# Patient Record
Sex: MALE | Race: WHITE | HISPANIC OR LATINO
[De-identification: names, ages, dates, MRNs, and addresses within clinical notes are randomized per-mention and may not be internally consistent; named-entity substitution may affect disease eponyms.]

---
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
## 2023-07-13 NOTE — ED PROVIDER NOTE - CONDITION AT DISCHARGE:
Satisfactory Paramedian Forehead Flap Division And Inset Text: Division and inset of the paramedian forehead flap was performed to achieve optimal aesthetic result, restore normal anatomic appearance and avoid distortion of normal anatomy, expedite and facilitate wound healing, achieve optimal functional result and because linear closure either not possible or would produce suboptimal result. The patient was prepped and draped in the usual manner. The pedicle was infiltrated with local anesthesia. The pedicle was sectioned with a #15 blade. The pedicle was de-bulked and trimmed to match the shape of the defect. Hemostasis was achieved. The flap donor site and free margin of the flap were secured with deep buried sutures and the wound edges were re-approximated.

## 2024-02-27 NOTE — ED PROVIDER NOTE - NS HIV RISK FACTOR YES
Outside referral received by patient's PCP,; procedure performed on 2/21/24; results noted Severe Obstructive Sleep apnea(PAIGE) with Severe Oxygen Desaturation; results routed to PCP to address recommendations with the patient.    Declined

## 2024-07-02 ENCOUNTER — INPATIENT (INPATIENT)
Facility: HOSPITAL | Age: 19
LOS: 6 days | Discharge: HOME | DRG: 751 | End: 2024-07-09
Attending: PSYCHIATRY & NEUROLOGY | Admitting: PSYCHIATRY & NEUROLOGY
Payer: MEDICAID

## 2024-07-02 VITALS
OXYGEN SATURATION: 99 % | HEIGHT: 73 IN | SYSTOLIC BLOOD PRESSURE: 133 MMHG | HEART RATE: 76 BPM | RESPIRATION RATE: 18 BRPM | DIASTOLIC BLOOD PRESSURE: 77 MMHG

## 2024-07-02 DIAGNOSIS — F32.A DEPRESSION, UNSPECIFIED: ICD-10-CM

## 2024-07-02 LAB
ANION GAP SERPL CALC-SCNC: 19 MMOL/L — HIGH (ref 7–14)
APAP SERPL-MCNC: <5 UG/ML — LOW (ref 10–30)
BASOPHILS # BLD AUTO: 0.04 K/UL — SIGNIFICANT CHANGE UP (ref 0–0.2)
BASOPHILS NFR BLD AUTO: 0.4 % — SIGNIFICANT CHANGE UP (ref 0–1)
BUN SERPL-MCNC: 10 MG/DL — SIGNIFICANT CHANGE UP (ref 10–20)
CALCIUM SERPL-MCNC: 9.7 MG/DL — SIGNIFICANT CHANGE UP (ref 8.4–10.5)
CHLORIDE SERPL-SCNC: 103 MMOL/L — SIGNIFICANT CHANGE UP (ref 98–110)
CO2 SERPL-SCNC: 17 MMOL/L — SIGNIFICANT CHANGE UP (ref 17–32)
CREAT SERPL-MCNC: 0.9 MG/DL — SIGNIFICANT CHANGE UP (ref 0.7–1.5)
EGFR: 125 ML/MIN/1.73M2 — SIGNIFICANT CHANGE UP
EOSINOPHIL # BLD AUTO: 0.02 K/UL — SIGNIFICANT CHANGE UP (ref 0–0.7)
EOSINOPHIL NFR BLD AUTO: 0.2 % — SIGNIFICANT CHANGE UP (ref 0–8)
ETHANOL SERPL-MCNC: <10 MG/DL — SIGNIFICANT CHANGE UP
GLUCOSE SERPL-MCNC: 100 MG/DL — HIGH (ref 70–99)
HCT VFR BLD CALC: 47.9 % — SIGNIFICANT CHANGE UP (ref 42–52)
HGB BLD-MCNC: 15.8 G/DL — SIGNIFICANT CHANGE UP (ref 14–18)
IMM GRANULOCYTES NFR BLD AUTO: 0.4 % — HIGH (ref 0.1–0.3)
LYMPHOCYTES # BLD AUTO: 1.84 K/UL — SIGNIFICANT CHANGE UP (ref 1.2–3.4)
LYMPHOCYTES # BLD AUTO: 16.2 % — LOW (ref 20.5–51.1)
MCHC RBC-ENTMCNC: 27.5 PG — SIGNIFICANT CHANGE UP (ref 27–31)
MCHC RBC-ENTMCNC: 33 G/DL — SIGNIFICANT CHANGE UP (ref 32–37)
MCV RBC AUTO: 83.3 FL — SIGNIFICANT CHANGE UP (ref 80–94)
MONOCYTES # BLD AUTO: 0.47 K/UL — SIGNIFICANT CHANGE UP (ref 0.1–0.6)
MONOCYTES NFR BLD AUTO: 4.1 % — SIGNIFICANT CHANGE UP (ref 1.7–9.3)
NEUTROPHILS # BLD AUTO: 8.97 K/UL — HIGH (ref 1.4–6.5)
NEUTROPHILS NFR BLD AUTO: 78.7 % — HIGH (ref 42.2–75.2)
NRBC # BLD: 0 /100 WBCS — SIGNIFICANT CHANGE UP (ref 0–0)
PLATELET # BLD AUTO: 214 K/UL — SIGNIFICANT CHANGE UP (ref 130–400)
PMV BLD: 9.4 FL — SIGNIFICANT CHANGE UP (ref 7.4–10.4)
POTASSIUM SERPL-MCNC: 4.6 MMOL/L — SIGNIFICANT CHANGE UP (ref 3.5–5)
POTASSIUM SERPL-SCNC: 4.6 MMOL/L — SIGNIFICANT CHANGE UP (ref 3.5–5)
RBC # BLD: 5.75 M/UL — SIGNIFICANT CHANGE UP (ref 4.7–6.1)
RBC # FLD: 13.7 % — SIGNIFICANT CHANGE UP (ref 11.5–14.5)
SALICYLATES SERPL-MCNC: <0.3 MG/DL — LOW (ref 4–30)
SODIUM SERPL-SCNC: 139 MMOL/L — SIGNIFICANT CHANGE UP (ref 135–146)
WBC # BLD: 11.38 K/UL — HIGH (ref 4.8–10.8)
WBC # FLD AUTO: 11.38 K/UL — HIGH (ref 4.8–10.8)

## 2024-07-02 PROCEDURE — 90792 PSYCH DIAG EVAL W/MED SRVCS: CPT | Mod: 95

## 2024-07-02 PROCEDURE — 99285 EMERGENCY DEPT VISIT HI MDM: CPT

## 2024-07-02 PROCEDURE — 93010 ELECTROCARDIOGRAM REPORT: CPT

## 2024-07-02 RX ORDER — CLONAZEPAM 2 MG/1
1 TABLET ORAL ONCE
Refills: 0 | Status: DISCONTINUED | OUTPATIENT
Start: 2024-07-02 | End: 2024-07-02

## 2024-07-02 RX ORDER — HYDROXYZINE PAMOATE 50 MG/1
50 CAPSULE ORAL ONCE
Refills: 0 | Status: COMPLETED | OUTPATIENT
Start: 2024-07-02 | End: 2024-07-02

## 2024-07-02 RX ADMIN — CLONAZEPAM 1 MILLIGRAM(S): 2 TABLET ORAL at 22:30

## 2024-07-02 NOTE — ED BEHAVIORAL HEALTH ASSESSMENT NOTE - MARITAL STATUS
Outpatient Medications Marked as Taking for the 11/10/20 encounter (Refill) with Blane Mcdonald MD   Medication Sig Dispense Refill   • metformin (GLUCOPHAGE) 1000 MG tablet Take 1,000 mg by mouth 2 times daily.        Med by hx only  Last Visit: 10/13/20  Next Visit: Visit date not found    Labs:     Hemoglobin A1C (%)   Date Value   10/01/2020 11.1 (H)       Refilled per [facility].   Single

## 2024-07-02 NOTE — ED BEHAVIORAL HEALTH ASSESSMENT NOTE - NSCURPASTPSYDX_PSY_ALL_CORE
Mood disorder/ADHD/Alcohol/Substance Use disorders/Cluster B Personality disorder/traits/Conduct problems

## 2024-07-02 NOTE — ED PROVIDER NOTE - EKG/XRAY ADDITIONAL INFORMATION
EKG normal sinus rhythm, right axis deviation, normal intervals, incomplete right bundle branch block, no ST depression or elevation

## 2024-07-02 NOTE — ED PROVIDER NOTE - OBJECTIVE STATEMENT
19-year-old male past medical history of PTSD, OCD, bipolar disorder presents to the ED for evaluation.  Patient with acute onset of suicidal ideations after finding out that his girlfriend has been cheating on him.  Reports that he would kill himself by jumping off a bridge.  Admits to prior suicide attempt by swallowing screws, cutting his wrists try to jump in front of moving vehicles.  Admits to intermittent auditory and visual hallucinations.  Reports that he has not been taking any of his psychiatric meds as he is homeless and does not have insurance and has not seen a psychiatrist.  No chest pain, shortness of abdominal pain, nausea, vomiting, dark,

## 2024-07-02 NOTE — ED PROVIDER NOTE - PHYSICAL EXAMINATION
CONST: Well appearing in NAD  EYES: PERRL, EOMI, Sclera and conjunctiva clear.   ENT: Oropharynx normal appearing, no erythema or exudates. Uvula midline.  CARD: Normal S1 S2; Normal rate and rhythm  RESP: Equal BS B/L, No wheezes, rhonchi or rales. No distress  GI: Soft, non-tender, non-distended.  MS: Normal ROM in all extremities. No midline spinal tenderness.  SKIN: Abrasions noted to L lateral neck.   NEURO: A&Ox3, No focal deficits. Strength 5/5 with no sensory deficits. Steady gait
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
## 2024-07-02 NOTE — ED PROVIDER NOTE - CLINICAL SUMMARY MEDICAL DECISION MAKING FREE TEXT BOX
19-year-old male with past medical history of depression, bipolar disorder, asthma, presents with suicidal ideation after finding out that his girlfriend was pregnant with another man child.  Also has recent homelessness.  Continues to report suicidal ideation.  No other physical complaints.  On exam nontoxic, vital signs noted, exam as above.  Labs no acute abnormalities.  Will admit to inpatient psychiatry

## 2024-07-02 NOTE — ED BEHAVIORAL HEALTH ASSESSMENT NOTE - VIOLENCE RISK FACTORS:
Antisocial behavior/cognition (past or present)/Substance abuse/Affective dysregulation/Irritability

## 2024-07-03 DIAGNOSIS — F32.A DEPRESSION, UNSPECIFIED: ICD-10-CM

## 2024-07-03 LAB
ALBUMIN SERPL ELPH-MCNC: 4.3 G/DL — SIGNIFICANT CHANGE UP (ref 3.5–5.2)
ALP SERPL-CCNC: 93 U/L — SIGNIFICANT CHANGE UP (ref 30–115)
ALT FLD-CCNC: 12 U/L — SIGNIFICANT CHANGE UP (ref 0–41)
ANION GAP SERPL CALC-SCNC: 6 MMOL/L — LOW (ref 7–14)
AST SERPL-CCNC: 14 U/L — SIGNIFICANT CHANGE UP (ref 0–41)
BILIRUB SERPL-MCNC: 0.5 MG/DL — SIGNIFICANT CHANGE UP (ref 0.2–1.2)
BUN SERPL-MCNC: 14 MG/DL — SIGNIFICANT CHANGE UP (ref 10–20)
CALCIUM SERPL-MCNC: 9.6 MG/DL — SIGNIFICANT CHANGE UP (ref 8.4–10.5)
CHLORIDE SERPL-SCNC: 103 MMOL/L — SIGNIFICANT CHANGE UP (ref 98–110)
CO2 SERPL-SCNC: 29 MMOL/L — SIGNIFICANT CHANGE UP (ref 17–32)
CREAT SERPL-MCNC: 0.9 MG/DL — SIGNIFICANT CHANGE UP (ref 0.7–1.5)
EGFR: 125 ML/MIN/1.73M2 — SIGNIFICANT CHANGE UP
FLUAV AG NPH QL: SIGNIFICANT CHANGE UP
FLUBV AG NPH QL: SIGNIFICANT CHANGE UP
GLUCOSE SERPL-MCNC: 76 MG/DL — SIGNIFICANT CHANGE UP (ref 70–99)
HCT VFR BLD CALC: 49.5 % — SIGNIFICANT CHANGE UP (ref 42–52)
HGB BLD-MCNC: 16.5 G/DL — SIGNIFICANT CHANGE UP (ref 14–18)
MCHC RBC-ENTMCNC: 27.8 PG — SIGNIFICANT CHANGE UP (ref 27–31)
MCHC RBC-ENTMCNC: 33.3 G/DL — SIGNIFICANT CHANGE UP (ref 32–37)
MCV RBC AUTO: 83.3 FL — SIGNIFICANT CHANGE UP (ref 80–94)
NRBC # BLD: 0 /100 WBCS — SIGNIFICANT CHANGE UP (ref 0–0)
PLATELET # BLD AUTO: 208 K/UL — SIGNIFICANT CHANGE UP (ref 130–400)
PMV BLD: 9.1 FL — SIGNIFICANT CHANGE UP (ref 7.4–10.4)
POTASSIUM SERPL-MCNC: 5 MMOL/L — SIGNIFICANT CHANGE UP (ref 3.5–5)
POTASSIUM SERPL-SCNC: 5 MMOL/L — SIGNIFICANT CHANGE UP (ref 3.5–5)
PROT SERPL-MCNC: 6.8 G/DL — SIGNIFICANT CHANGE UP (ref 6–8)
RBC # BLD: 5.94 M/UL — SIGNIFICANT CHANGE UP (ref 4.7–6.1)
RBC # FLD: 13.5 % — SIGNIFICANT CHANGE UP (ref 11.5–14.5)
RSV RNA NPH QL NAA+NON-PROBE: SIGNIFICANT CHANGE UP
SARS-COV-2 RNA SPEC QL NAA+PROBE: SIGNIFICANT CHANGE UP
SODIUM SERPL-SCNC: 138 MMOL/L — SIGNIFICANT CHANGE UP (ref 135–146)
WBC # BLD: 9.95 K/UL — SIGNIFICANT CHANGE UP (ref 4.8–10.8)
WBC # FLD AUTO: 9.95 K/UL — SIGNIFICANT CHANGE UP (ref 4.8–10.8)

## 2024-07-03 PROCEDURE — 83036 HEMOGLOBIN GLYCOSYLATED A1C: CPT

## 2024-07-03 PROCEDURE — 99231 SBSQ HOSP IP/OBS SF/LOW 25: CPT

## 2024-07-03 PROCEDURE — 36415 COLL VENOUS BLD VENIPUNCTURE: CPT

## 2024-07-03 PROCEDURE — 85027 COMPLETE CBC AUTOMATED: CPT

## 2024-07-03 PROCEDURE — 84443 ASSAY THYROID STIM HORMONE: CPT

## 2024-07-03 PROCEDURE — 80053 COMPREHEN METABOLIC PANEL: CPT

## 2024-07-03 PROCEDURE — 99253 IP/OBS CNSLTJ NEW/EST LOW 45: CPT

## 2024-07-03 RX ORDER — HALOPERIDOL DECANOATE 100 MG/ML
5 VIAL (ML) INTRAMUSCULAR EVERY 6 HOURS
Refills: 0 | Status: DISCONTINUED | OUTPATIENT
Start: 2024-07-03 | End: 2024-07-03

## 2024-07-03 RX ORDER — ALBUTEROL 90 MCG
2 AEROSOL REFILL (GRAM) INHALATION EVERY 6 HOURS
Refills: 0 | Status: DISCONTINUED | OUTPATIENT
Start: 2024-07-03 | End: 2024-07-09

## 2024-07-03 RX ORDER — HALOPERIDOL DECANOATE 100 MG/ML
5 VIAL (ML) INTRAMUSCULAR EVERY 6 HOURS
Refills: 0 | Status: DISCONTINUED | OUTPATIENT
Start: 2024-07-03 | End: 2024-07-09

## 2024-07-03 RX ORDER — LORAZEPAM 0.5 MG
2 TABLET ORAL EVERY 6 HOURS
Refills: 0 | Status: DISCONTINUED | OUTPATIENT
Start: 2024-07-03 | End: 2024-07-09

## 2024-07-03 RX ORDER — HALOPERIDOL DECANOATE 100 MG/ML
5 VIAL (ML) INTRAMUSCULAR
Refills: 0 | Status: DISCONTINUED | OUTPATIENT
Start: 2024-07-03 | End: 2024-07-09

## 2024-07-03 RX ADMIN — Medication 2 MILLIGRAM(S): at 13:43

## 2024-07-03 RX ADMIN — Medication 5 MILLIGRAM(S): at 13:45

## 2024-07-03 RX ADMIN — Medication 5 MILLIGRAM(S): at 20:18

## 2024-07-03 NOTE — BH PATIENT PROFILE - NSSBIRTDRGILLEGACT_GEN_A_CORE
[FreeTextEntry1] : X-ray of cervical spine reveals straightening of cervical lordosis mild level scoliosis grade 1 retrolithiasis of C4-C5.  Triglycerides 366 HDL 38, creatinine 155 BUN 28 GFR 46.  TSH 5.5 vitamin D 21.7 No

## 2024-07-03 NOTE — BH INPATIENT PSYCHIATRY ASSESSMENT NOTE - NSCOMMENTSUICRISKFACT_PSY_ALL_CORE
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
## 2024-07-03 NOTE — BH PATIENT PROFILE - HOME MEDICATIONS
Glucophage 500 mg oral tablet , 1 tab(s) orally 2 times a day  guanFACINE 4 mg oral tablet, extended release , 1 tab(s) orally once a day (in the morning)  Ritalin LA 20 mg/24 hr oral capsule, extended release , 1 cap(s) orally once a day (in the morning)  Colace 100 mg oral capsule , 1 cap(s) orally 2 times a day  MiraLax oral powder for reconstitution , 1 dose(s) orally 2 times a day, As Needed  Flonase 50 mcg/inh nasal spray , 1 spray(s) nasal 2 times a day  Senna 8.6 mg oral tablet , 2 tab(s) orally once a day (at bedtime)  Vyvanse 40 mg oral capsule , 1 cap(s) orally once a day (in the morning)  cloZAPine 200 mg oral tablet , 1 tab(s) orally once a day (at bedtime)  Multiple Vitamins with Iron oral tablet , 1 tab(s) orally once a day  Clozaril 100 mg oral tablet , 1 tab(s) orally 2 times a day  SEROquel 100 mg oral tablet , 1.5 tab(s) orally 2 times a day

## 2024-07-03 NOTE — BH INPATIENT PSYCHIATRY ASSESSMENT NOTE - ADDITIONAL DETAILS ALL
See hpi   Patient reports, "always thinking about how I could die" however  historical reports are so self-contradictory and his demeanor so superficial it is difficult to know what he is thinking and has thought in the past.

## 2024-07-03 NOTE — BH INPATIENT PSYCHIATRY ASSESSMENT NOTE - CURRENT MEDICATION
MEDICATIONS  (STANDING):    MEDICATIONS  (PRN):  albuterol    90 MICROgram(s) HFA Inhaler 2 Puff(s) Inhalation every 6 hours PRN Shortness of Breath  haloperidol     Tablet 5 milliGRAM(s) Oral every 6 hours PRN agitation  LORazepam     Tablet 2 milliGRAM(s) Oral every 6 hours PRN Anxiety   MEDICATIONS  (STANDING):  haloperidol     Tablet 5 milliGRAM(s) Oral two times a day    MEDICATIONS  (PRN):  albuterol    90 MICROgram(s) HFA Inhaler 2 Puff(s) Inhalation every 6 hours PRN Shortness of Breath  haloperidol     Tablet 5 milliGRAM(s) Oral every 6 hours PRN agitation  not responding to reassurance  LORazepam     Tablet 2 milliGRAM(s) Oral every 6 hours PRN Anxiety

## 2024-07-03 NOTE — BH INPATIENT PSYCHIATRY ASSESSMENT NOTE - RISK ASSESSMENT
See above  Patient describes terrible early life, isolation from everyone and chronic suicidal ideas.  He is clearly not accurate in much of his history.  However what information we do have about his historical behavior hospitalizations and legal involvement, coupled with his presentation and reports of self-harm are consistent with his severe mixed character disorder with narcissistic and borderline traits putting him at significant risk regardless of the veracity of any of the particular facts he has told us.

## 2024-07-03 NOTE — CONSULT NOTE ADULT - ASSESSMENT
18 yo M, single, non-caregiver, undomiciled, unemployed, PMH significant for asthma, per PSYCKES has PPHx of ADHD, various mood disorders(Bipolar Disorder, MDD, DMDD), multiple substance related disorders(MJ, opioids, stimulants, psychoactive substances), ODD, Antisocial PD, ASD, multiple psych admissions, including State, was last admitted to [facility] in 2023, reports pSAs by cutting (last did a few years ago). Patient admitted to in Psych for active SI with a plan. Medicine consulted for inpatient evaluation.    #Suicidal Ideations w/ plan   #MDD  #ASD  #Polysubstance abuse   #ADHD  #Bipolar PD        Plan:  EKG: RAD, RBBB  Labs and VS reviewed   - Psych management per primary team  - [facility] consult / CATCH team when appropriate   - consider SW consult when appropriate  - Should routinely follow-up with Primary Doctor/Cardiologist for EKG abnormalities
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
## 2024-07-03 NOTE — BH INPATIENT PSYCHIATRY ASSESSMENT NOTE - OTHER
at times is incongruent  superficial and not always congruent   As per judgment.   Thus far on the unit judgment is adequate to keep himself safe.  He reported at 1 point he was going to hurt himself right in the hallway but went to his room and excepted medications.
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
## 2024-07-03 NOTE — BH PATIENT PROFILE - NSICDXPASTMEDICALHX_GEN_ALL_CORE_FT
PAST MEDICAL HISTORY:  Acne     ADHD     Disruptive mood dysregulation disorder     Seasonal allergies
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
## 2024-07-03 NOTE — BH INPATIENT PSYCHIATRY ASSESSMENT NOTE - OTHER PAST PSYCHIATRIC HISTORY (INCLUDE DETAILS REGARDING ONSET, COURSE OF ILLNESS, INPATIENT/OUTPATIENT TREATMENT)
Patient identifies multiple past psychiatric hospitalizations however denies any and all drug  that was reported in the ED.   He reports when he was younger he used to cut himself which  resulted in the hospitalizations.

## 2024-07-03 NOTE — BH INPATIENT PSYCHIATRY ASSESSMENT NOTE - NSBHMSESPEECH_PSY_A_CORE
Discharge instructions reviewed with parents and questions answered at this time Normal volume, rate, productivity, spontaneity and articulation

## 2024-07-03 NOTE — BH INPATIENT PSYCHIATRY ASSESSMENT NOTE - PAST PSYCHOTROPIC MEDICATION
Guanfacine, Clozapine, Depakote   Patient reports "I cannot remember the names of any of the medicines I took". Guanfacine, Clozapine, Depakote   According to PSYCKES  not used for several years.    Patient reports "I cannot remember the names of any of the medicines I took".

## 2024-07-03 NOTE — BH INPATIENT PSYCHIATRY ASSESSMENT NOTE - NSCOMMENTSUICPROTRISKFACT_PSY_ALL_CORE
Patient reports "I have no one".  But as previously noted his reports seem to be markedly self-contradictory.

## 2024-07-03 NOTE — BH INPATIENT PSYCHIATRY ASSESSMENT NOTE - NSBHCHARTREVIEWVS_PSY_A_CORE FT
Vital Signs Last 24 Hrs  T(C): 36 (07-03-24 @ 15:25), Max: 36.8 (07-03-24 @ 06:58)  T(F): 96.8 (07-03-24 @ 15:25), Max: 98.2 (07-03-24 @ 06:58)  HR: 64 (07-03-24 @ 15:25) (64 - 76)  BP: 115/77 (07-03-24 @ 15:25) (114/49 - 133/77)  BP(mean): 71 (07-03-24 @ 03:55) (71 - 71)  RR: 18 (07-03-24 @ 15:25) (18 - 18)  SpO2: 99% (07-03-24 @ 03:55) (99% - 99%)     Vital Signs Last 24 Hrs  T(C): 36 (07-03-24 @ 15:25), Max: 36.8 (07-03-24 @ 06:58)  T(F): 96.8 (07-03-24 @ 15:25), Max: 98.2 (07-03-24 @ 06:58)  HR: 64 (07-03-24 @ 15:25) (64 - 70)  BP: 115/77 (07-03-24 @ 15:25) (114/49 - 116/69)  BP(mean): 71 (07-03-24 @ 03:55) (71 - 71)  RR: 18 (07-03-24 @ 15:25) (18 - 18)  SpO2: 99% (07-03-24 @ 03:55) (99% - 99%)

## 2024-07-03 NOTE — PSYCHIATRIC REHAB INITIAL EVALUATION - PRO INTERPRETER NEED 2
Patient was asked the following questions during liver intake call.     Referring Provider: Dr. Anupam Lee   Referring Diagnosis: Pancreatic Cancer    Liver masses / Fatty Liver   PCP: Dr. Marie Brar    1)Do you know why you have liver disease: Yes             If Alcoholic Cirrhosis is present when was your last drink: NA             Have you ever been through treatment for alcohol: No  2) Presence of Ascites: Yes Paracentesis: No  3) Presence of Hepatic Encephalopathy: No Medications: No  4) History of GI Bleeding: No  5) Oxygen Use: None  6) EGD: Yes Where: [facility] When: 2021  7) Colonoscopy: None  8) MELD Score: No Current Labs   9) Insurance information: [employer]       Policy rodarte: Spouse: Corbin Bobo       Subscriber/policy/ID number: [id number]      Group Number:     Referral intake process completed.  Patient is aware that after financial approval is received, medical records will be requested.   Patient confirmed for a callback from transplant coordinator on 2/4/2020.  Tentative evaluation date TBD.    Confirmed coordinator will discuss evaluation process in more detail at the time of their call.   Patient is aware of the need to arrange age appropriate cancer screening, vaccinations, and dental care.  Reminded patient to complete questionnaire, complete medical records release, and review packet prior to evaluation visit .  Assessed patient for special needs (ie--wheelchair, assistance, guardian, and ):  None    Patient instructed to call [phone] with questions.     Patient gave verbal consent during intake call to obtain medical records and documents outside of [facility]:  Yes    CHINEDU Palm, LPN   Solid Organ Transplant         English

## 2024-07-03 NOTE — BH INPATIENT PSYCHIATRY ASSESSMENT NOTE - NSBHMETABOLIC_PSY_ALL_CORE_FT
BMI: BMI (kg/m2): 30.2 (07-03-24 @ 06:58)  HbA1c:   Glucose:   BP: 115/77 (07-03-24 @ 15:25) (114/49 - 133/77)Vital Signs Last 24 Hrs  T(C): 36 (07-03-24 @ 15:25), Max: 36.8 (07-03-24 @ 06:58)  T(F): 96.8 (07-03-24 @ 15:25), Max: 98.2 (07-03-24 @ 06:58)  HR: 64 (07-03-24 @ 15:25) (64 - 76)  BP: 115/77 (07-03-24 @ 15:25) (114/49 - 133/77)  BP(mean): 71 (07-03-24 @ 03:55) (71 - 71)  RR: 18 (07-03-24 @ 15:25) (18 - 18)  SpO2: 99% (07-03-24 @ 03:55) (99% - 99%)      Lipid Panel:  BMI: BMI (kg/m2): 30.2 (07-03-24 @ 06:58)  HbA1c:   Glucose:   BP: 115/77 (07-03-24 @ 15:25) (114/49 - 133/77)Vital Signs Last 24 Hrs  T(C): 36 (07-03-24 @ 15:25), Max: 36.8 (07-03-24 @ 06:58)  T(F): 96.8 (07-03-24 @ 15:25), Max: 98.2 (07-03-24 @ 06:58)  HR: 64 (07-03-24 @ 15:25) (64 - 70)  BP: 115/77 (07-03-24 @ 15:25) (114/49 - 116/69)  BP(mean): 71 (07-03-24 @ 03:55) (71 - 71)  RR: 18 (07-03-24 @ 15:25) (18 - 18)  SpO2: 99% (07-03-24 @ 03:55) (99% - 99%)      Lipid Panel:

## 2024-07-03 NOTE — BH INPATIENT PSYCHIATRY ASSESSMENT NOTE - LEGAL HISTORY
See hpi Patient reports that he has never been incarcerated except overnight  despite what appears to be a significant legal history which she apparently admitted to in the ED or at least did not contradict.

## 2024-07-03 NOTE — BH PATIENT PROFILE - FUNCTIONAL ASSESSMENT - BASIC MOBILITY SECTION LABEL
Contacted patient for f/u. Patient has been having pain on bilateral sides of abdomen. She states she has a sharp pain on the L side and burning type pain on the R side. She has been taking tramadol as needed. She also has an umbilical hernia and is scheduled to see general surgeon on Monday for consultation. She reports a diminished appetite with nausea present. She is currently on macrobid for UTI. She has been having issues with constipation, taking miralax and colace. She denies chest pain or sob. She has slight edema to feet. She was tearful on the phone and states she is feeling very anxious regarding her medical issues. She is taking ativan which she states is helping. She denies needing assistance at home. She is taking her medications but mentioned she was having trouble swallowing carafate. Suggested she break it up and take with applesauce which will make it easier to swallow. Follow up appointments scheduled. She discussed having financial difficulties with her rent and other bills along with her car needs repairs. Will refer to NELLY DEE to discuss financial difficulties,  she was agreeable. Arranged to contact her back in a few weeks for f/u. She was agreeable.
.

## 2024-07-04 PROCEDURE — 99232 SBSQ HOSP IP/OBS MODERATE 35: CPT

## 2024-07-04 RX ORDER — NICOTINE POLACRILEX 2 MG/1
2 LOZENGE ORAL EVERY 4 HOURS
Refills: 0 | Status: DISCONTINUED | OUTPATIENT
Start: 2024-07-04 | End: 2024-07-09

## 2024-07-04 RX ORDER — FLUTICASONE PROPIONATE 50 UG/1
1 SPRAY, METERED NASAL
Refills: 0 | Status: DISCONTINUED | OUTPATIENT
Start: 2024-07-04 | End: 2024-07-09

## 2024-07-04 RX ADMIN — Medication 5 MILLIGRAM(S): at 20:27

## 2024-07-04 RX ADMIN — Medication 5 MILLIGRAM(S): at 08:01

## 2024-07-04 RX ADMIN — FLUTICASONE PROPIONATE 1 SPRAY(S): 50 SPRAY, METERED NASAL at 20:27

## 2024-07-04 RX ADMIN — Medication 2 MILLIGRAM(S): at 20:59

## 2024-07-04 NOTE — BH INPATIENT PSYCHIATRY PROGRESS NOTE - NSBHATTESTTYPEVISIT_PSY_A_CORE
On-site Attending with Resident/Fellow/Student and LÓPEZ (99XXX codes) Attending with Resident/Fellow/Student

## 2024-07-04 NOTE — BH INPATIENT PSYCHIATRY PROGRESS NOTE - OTHER
Thus far on the unit judgment is adequate to keep himself safe.  He reported at 1 point he was going to hurt himself right in the hallway but went to his room and accepted medications.   As per judgment. Not readily engaging.

## 2024-07-05 LAB
A1C WITH ESTIMATED AVERAGE GLUCOSE RESULT: 5.1 % — SIGNIFICANT CHANGE UP (ref 4–5.6)
ESTIMATED AVERAGE GLUCOSE: 100 MG/DL — SIGNIFICANT CHANGE UP (ref 68–114)
TSH SERPL-MCNC: 1.23 UIU/ML — SIGNIFICANT CHANGE UP (ref 0.27–4.2)

## 2024-07-05 RX ORDER — ACETAMINOPHEN 325 MG
650 TABLET ORAL EVERY 6 HOURS
Refills: 0 | Status: DISCONTINUED | OUTPATIENT
Start: 2024-07-05 | End: 2024-07-09

## 2024-07-05 RX ORDER — PAROXETINE HYDROCHLORIDE 37.5 MG/1
10 TABLET, FILM COATED, EXTENDED RELEASE ORAL DAILY
Refills: 0 | Status: DISCONTINUED | OUTPATIENT
Start: 2024-07-05 | End: 2024-07-09

## 2024-07-05 RX ORDER — TRAZODONE HYDROCHLORIDE 50 MG/1
50 TABLET, FILM COATED ORAL AT BEDTIME
Refills: 0 | Status: DISCONTINUED | OUTPATIENT
Start: 2024-07-05 | End: 2024-07-09

## 2024-07-05 RX ADMIN — FLUTICASONE PROPIONATE 1 SPRAY(S): 50 SPRAY, METERED NASAL at 09:23

## 2024-07-05 RX ADMIN — TRAZODONE HYDROCHLORIDE 50 MILLIGRAM(S): 50 TABLET, FILM COATED ORAL at 22:03

## 2024-07-05 RX ADMIN — FLUTICASONE PROPIONATE 1 SPRAY(S): 50 SPRAY, METERED NASAL at 20:04

## 2024-07-05 RX ADMIN — Medication 2 MILLIGRAM(S): at 09:57

## 2024-07-05 RX ADMIN — Medication 2 MILLIGRAM(S): at 20:04

## 2024-07-05 RX ADMIN — Medication 5 MILLIGRAM(S): at 20:04

## 2024-07-05 RX ADMIN — Medication 5 MILLIGRAM(S): at 08:14

## 2024-07-05 NOTE — BH DISCHARGE NOTE NURSING/SOCIAL WORK/PSYCH REHAB - NSDCPEWEB_GEN_ALL_CORE
[facility] for Tobacco Control website --- [email or web address] website --- [email or web address]

## 2024-07-05 NOTE — BH DISCHARGE NOTE NURSING/SOCIAL WORK/PSYCH REHAB - NSDCPEEMAIL_GEN_ALL_CORE
[facility] for Tobacco Control email [email or web address]

## 2024-07-05 NOTE — BH INPATIENT PSYCHIATRY PROGRESS NOTE - OTHER
Patient repeatedly blames problems on others but does not seem delusional. Patient punched a wall shortly after interview. Not readily engaging. Behavior was also labile - yelling then apologized shortly thereafter. Thus far on the unit judgment is adequate to keep himself safe.  He reported at 1 point he was going to hurt himself right in the hallway but went to his room and accepted medications. Repeated outbursts represent significant lapses in judgment since they are not getting him what he wants.   As per judgment. Today, was able to make eye contact and connect during conversation.

## 2024-07-05 NOTE — BH DISCHARGE NOTE NURSING/SOCIAL WORK/PSYCH REHAB - NSCDUDCCRISIS_PSY_A_CORE
[facility] Well  1 (339) [facility]LL ([phone])  Text "WELL" to 77886  Website: www.[employer].Medcurrent/.National Suicide Prevention Lifeline 5 (869) 546-6396/.  [employer]  1 (434) LIFENET (633-3513)/988 Suicide and Crisis Lifeline

## 2024-07-05 NOTE — BH DISCHARGE NOTE NURSING/SOCIAL WORK/PSYCH REHAB - PATIENT PORTAL LINK FT
You can access the FollowMyHealth Patient Portal offered by [facility] by registering at the following website: [email or web address]. By joining [facility]’s FollowMyHealth portal, you will also be able to view your health information using other applications (apps) compatible with our system.

## 2024-07-05 NOTE — BH DISCHARGE NOTE NURSING/SOCIAL WORK/PSYCH REHAB - NSBHDCAGENCY2FT_PSY_A_CORE
[facility]  Project [facility]   [facility] [place]

## 2024-07-05 NOTE — BH DISCHARGE NOTE NURSING/SOCIAL WORK/PSYCH REHAB - NSBHDCAGENCY1FT_PSY_A_CORE
unknown . From [facility]  [facility]   With Tamika

## 2024-07-05 NOTE — BH DISCHARGE NOTE NURSING/SOCIAL WORK/PSYCH REHAB - NSTOBACCOSMKCESSPRO_PSY_ALL_CORE
.  [facility] Smoking Cessation Support Group   [facility]  [address] [place] [address] [place] [address]  [phone] or [phone]

## 2024-07-06 PROCEDURE — 99231 SBSQ HOSP IP/OBS SF/LOW 25: CPT

## 2024-07-06 RX ADMIN — Medication 2 MILLIGRAM(S): at 22:31

## 2024-07-06 RX ADMIN — PAROXETINE HYDROCHLORIDE 10 MILLIGRAM(S): 37.5 TABLET, FILM COATED, EXTENDED RELEASE ORAL at 08:12

## 2024-07-06 RX ADMIN — FLUTICASONE PROPIONATE 1 SPRAY(S): 50 SPRAY, METERED NASAL at 20:23

## 2024-07-06 RX ADMIN — Medication 2 MILLIGRAM(S): at 15:07

## 2024-07-06 RX ADMIN — TRAZODONE HYDROCHLORIDE 50 MILLIGRAM(S): 50 TABLET, FILM COATED ORAL at 20:23

## 2024-07-06 RX ADMIN — Medication 5 MILLIGRAM(S): at 20:23

## 2024-07-06 RX ADMIN — FLUTICASONE PROPIONATE 1 SPRAY(S): 50 SPRAY, METERED NASAL at 08:12

## 2024-07-06 RX ADMIN — Medication 5 MILLIGRAM(S): at 08:13

## 2024-07-06 NOTE — BH INPATIENT PSYCHIATRY PROGRESS NOTE - OTHER
Not readily engaging. Behavior was also labile - yelling then apologized shortly thereafter.   As per judgment. Patient punched a wall shortly after interview. Patient repeatedly blames problems on others but does not seem delusional. Today, was able to make eye contact and connect during conversation. Thus far on the unit judgment is adequate to keep himself safe.  He reported at 1 point he was going to hurt himself right in the hallway but went to his room and accepted medications. Repeated outbursts represent significant lapses in judgment since they are not getting him what he wants.

## 2024-07-07 RX ADMIN — Medication 2 MILLIGRAM(S): at 19:54

## 2024-07-07 RX ADMIN — Medication 5 MILLIGRAM(S): at 08:01

## 2024-07-07 RX ADMIN — Medication 5 MILLIGRAM(S): at 19:53

## 2024-07-07 RX ADMIN — PAROXETINE HYDROCHLORIDE 10 MILLIGRAM(S): 37.5 TABLET, FILM COATED, EXTENDED RELEASE ORAL at 08:01

## 2024-07-07 RX ADMIN — FLUTICASONE PROPIONATE 1 SPRAY(S): 50 SPRAY, METERED NASAL at 19:53

## 2024-07-07 RX ADMIN — TRAZODONE HYDROCHLORIDE 50 MILLIGRAM(S): 50 TABLET, FILM COATED ORAL at 19:53

## 2024-07-07 RX ADMIN — FLUTICASONE PROPIONATE 1 SPRAY(S): 50 SPRAY, METERED NASAL at 08:01

## 2024-07-07 NOTE — BH INPATIENT PSYCHIATRY PROGRESS NOTE - OTHER
As per judgment. Thus far on the unit judgment is adequate to keep himself safe.  He reported at 1 point he was going to hurt himself right in the hallway but went to his room and accepted medications. Repeated outbursts represent significant lapses in judgment since they are not getting him what he wants. Not readily engaging. Behavior was also labile - yelling then apologized shortly thereafter. Today, was able to make eye contact and connect during conversation. Patient punched a wall shortly after interview. Patient repeatedly blames problems on others but does not seem delusional.

## 2024-07-08 RX ADMIN — PAROXETINE HYDROCHLORIDE 10 MILLIGRAM(S): 37.5 TABLET, FILM COATED, EXTENDED RELEASE ORAL at 08:13

## 2024-07-08 RX ADMIN — Medication 5 MILLIGRAM(S): at 08:14

## 2024-07-08 RX ADMIN — FLUTICASONE PROPIONATE 1 SPRAY(S): 50 SPRAY, METERED NASAL at 20:17

## 2024-07-08 RX ADMIN — FLUTICASONE PROPIONATE 1 SPRAY(S): 50 SPRAY, METERED NASAL at 08:13

## 2024-07-08 RX ADMIN — TRAZODONE HYDROCHLORIDE 50 MILLIGRAM(S): 50 TABLET, FILM COATED ORAL at 20:17

## 2024-07-08 RX ADMIN — Medication 2 MILLIGRAM(S): at 21:50

## 2024-07-08 RX ADMIN — Medication 5 MILLIGRAM(S): at 20:17

## 2024-07-08 NOTE — BH INPATIENT PSYCHIATRY PROGRESS NOTE - NSBHCHARTREVIEWVS_PSY_A_CORE FT
Vital Signs Last 24 Hrs  T(C): 37 (07-05-24 @ 16:00), Max: 37 (07-05-24 @ 16:00)  T(F): 98.6 (07-05-24 @ 16:00), Max: 98.6 (07-05-24 @ 16:00)  HR: 144 (07-05-24 @ 16:00) (144 - 144)  BP: 98/64 (07-05-24 @ 16:00) (98/64 - 98/64)  BP(mean): --  RR: 18 (07-05-24 @ 16:00) (18 - 18)  SpO2: --    
Vital Signs Last 24 Hrs  T(C): 36.8 (07-07-24 @ 15:35), Max: 36.8 (07-07-24 @ 15:35)  T(F): 98.2 (07-07-24 @ 15:35), Max: 98.2 (07-07-24 @ 15:35)  HR: 65 (07-07-24 @ 15:35) (65 - 88)  BP: 113/67 (07-07-24 @ 15:35) (103/74 - 113/67)  BP(mean): --  RR: 18 (07-07-24 @ 15:35) (18 - 18)  SpO2: --    
Vital Signs Last 24 Hrs  T(C): 36.3 (07-04-24 @ 08:44), Max: 36.3 (07-04-24 @ 08:44)  T(F): 97.4 (07-04-24 @ 08:44), Max: 97.4 (07-04-24 @ 08:44)  HR: 92 (07-04-24 @ 08:44) (64 - 92)  BP: 104/69 (07-04-24 @ 08:44) (104/69 - 115/77)  BP(mean): --  RR: 18 (07-04-24 @ 08:44) (18 - 18)  SpO2: --    
Vital Signs Last 24 Hrs  T(C): 36.4 (07-08-24 @ 16:15), Max: 36.4 (07-08-24 @ 16:15)  T(F): 97.6 (07-08-24 @ 16:15), Max: 97.6 (07-08-24 @ 16:15)  HR: 75 (07-08-24 @ 16:15) (75 - 82)  BP: 123/78 (07-08-24 @ 16:15) (120/83 - 123/78)  BP(mean): --  RR: 16 (07-08-24 @ 16:15) (16 - 16)  SpO2: --

## 2024-07-08 NOTE — BH INPATIENT PSYCHIATRY PROGRESS NOTE - NSTXSUICIDINTERMD_PSY_ALL_CORE
Psychiatric medications as needed.
Psychiatric interviewing and medications as needed.
Psychiatric medications as needed.

## 2024-07-08 NOTE — BH INPATIENT PSYCHIATRY PROGRESS NOTE - NSICDXBHSECONDARYDX_PSY_ALL_CORE
Depression, unspecified depression type   F32.A  

## 2024-07-08 NOTE — BH SAFETY PLAN - DISTRACTION PLACE 1
One place that is safe and will provide a distraction would be behind the house where there is water.

## 2024-07-08 NOTE — BH INPATIENT PSYCHIATRY PROGRESS NOTE - NSTXDCHOUSGOAL_PSY_ALL_CORE
Will meet with care coordinator and accept services

## 2024-07-08 NOTE — BH INPATIENT PSYCHIATRY PROGRESS NOTE - NSBHMSEBEHAV_PSY_A_CORE
Cooperative
Uncooperative/Other
Uncooperative/Hostile/Other

## 2024-07-08 NOTE — BH INPATIENT PSYCHIATRY PROGRESS NOTE - NSBHMETABOLIC_PSY_ALL_CORE_FT
BMI: BMI (kg/m2): 30.2 (07-03-24 @ 06:58)  HbA1c: A1C with Estimated Average Glucose Result: 5.1 % (07-03-24 @ 22:00)    Glucose:   BP: 104/69 (07-04-24 @ 08:44) (104/69 - 133/77)Vital Signs Last 24 Hrs  T(C): --  T(F): --  HR: --  BP: --  BP(mean): --  RR: --  SpO2: --      Lipid Panel: 
BMI: BMI (kg/m2): 30.2 (07-03-24 @ 06:58)  HbA1c: A1C with Estimated Average Glucose Result: 5.1 % (07-03-24 @ 22:00)    Glucose:   BP: 98/64 (07-05-24 @ 16:00) (98/64 - 115/77)Vital Signs Last 24 Hrs  T(C): 37 (07-05-24 @ 16:00), Max: 37 (07-05-24 @ 16:00)  T(F): 98.6 (07-05-24 @ 16:00), Max: 98.6 (07-05-24 @ 16:00)  HR: 144 (07-05-24 @ 16:00) (144 - 144)  BP: 98/64 (07-05-24 @ 16:00) (98/64 - 98/64)  BP(mean): --  RR: 18 (07-05-24 @ 16:00) (18 - 18)  SpO2: --      Lipid Panel: 
BMI: BMI (kg/m2): 30.2 (07-03-24 @ 06:58)  HbA1c:   Glucose:   BP: 104/69 (07-04-24 @ 08:44) (104/69 - 133/77)Vital Signs Last 24 Hrs  T(C): 36.3 (07-04-24 @ 08:44), Max: 36.3 (07-04-24 @ 08:44)  T(F): 97.4 (07-04-24 @ 08:44), Max: 97.4 (07-04-24 @ 08:44)  HR: 92 (07-04-24 @ 08:44) (64 - 92)  BP: 104/69 (07-04-24 @ 08:44) (104/69 - 115/77)  BP(mean): --  RR: 18 (07-04-24 @ 08:44) (18 - 18)  SpO2: --      Lipid Panel: 
BMI: BMI (kg/m2): 30.2 (07-03-24 @ 06:58)  HbA1c: A1C with Estimated Average Glucose Result: 5.1 % (07-03-24 @ 22:00)    Glucose:   BP: 123/78 (07-08-24 @ 16:15) (103/74 - 123/78)Vital Signs Last 24 Hrs  T(C): 36.4 (07-08-24 @ 16:15), Max: 36.4 (07-08-24 @ 16:15)  T(F): 97.6 (07-08-24 @ 16:15), Max: 97.6 (07-08-24 @ 16:15)  HR: 75 (07-08-24 @ 16:15) (75 - 82)  BP: 123/78 (07-08-24 @ 16:15) (120/83 - 123/78)  BP(mean): --  RR: 16 (07-08-24 @ 16:15) (16 - 16)  SpO2: --      Lipid Panel: 
BMI: BMI (kg/m2): 30.2 (07-03-24 @ 06:58)  HbA1c: A1C with Estimated Average Glucose Result: 5.1 % (07-03-24 @ 22:00)    Glucose:   BP: 113/67 (07-07-24 @ 15:35) (98/64 - 113/67)Vital Signs Last 24 Hrs  T(C): 36.8 (07-07-24 @ 15:35), Max: 36.8 (07-07-24 @ 15:35)  T(F): 98.2 (07-07-24 @ 15:35), Max: 98.2 (07-07-24 @ 15:35)  HR: 65 (07-07-24 @ 15:35) (65 - 88)  BP: 113/67 (07-07-24 @ 15:35) (103/74 - 113/67)  BP(mean): --  RR: 18 (07-07-24 @ 15:35) (18 - 18)  SpO2: --      Lipid Panel:

## 2024-07-08 NOTE — BH INPATIENT PSYCHIATRY PROGRESS NOTE - NSBHATTESTBILLING_PSY_A_CORE
63916-Inywjcdhmb OBS or IP - low complexity OR 25-34 mins
22191-Dpwkyxxnab OBS or IP - low complexity OR 25-34 mins
37316-Kjdtzgrwrg OBS or IP - low complexity OR 25-34 mins
98229-Mdhakarige OBS or IP - low complexity OR 25-34 mins
01701-Gyaarkyeja OBS or IP - low complexity OR 25-34 mins

## 2024-07-08 NOTE — BH INPATIENT PSYCHIATRY PROGRESS NOTE - NSBHCONSDANGERSELF_PSY_A_CORE
36.2
suicidal ideation with plan and means

## 2024-07-08 NOTE — BH INPATIENT PSYCHIATRY PROGRESS NOTE - NSBHCHARTREVIEWLAB_PSY_A_CORE FT
Complete Blood Count (07.03.24 @ 22:00)   Nucleated RBC: 0 /100 WBCs  WBC Count: 9.95 K/uL  RBC Count: 5.94 M/uL  Hemoglobin: 16.5 g/dL  Hematocrit: 49.5 %  Mean Cell Volume: 83.3 fL  Mean Cell Hemoglobin: 27.8 pg  Mean Cell Hemoglobin Conc: 33.3 g/dL  Red Cell Distrib Width: 13.5 %  Platelet Count - Automated: 208 K/uL  MPV: 9.1 fL    Comprehensive Metabolic Panel (07.03.24 @ 22:00)   Sodium: 138 mmol/L  Potassium: 5.0 mmol/L  Chloride: 103 mmol/L  Carbon Dioxide: 29 mmol/L  Anion Gap: 6 mmol/L  Blood Urea Nitrogen: 14 mg/dL  Creatinine: 0.9 mg/dL  Glucose: 76 mg/dL  Calcium: 9.6 mg/dL  Protein Total: 6.8 g/dL  Albumin: 4.3 g/dL  Bilirubin Total: 0.5 mg/dL  Alkaline Phosphatase: 93 U/L  Aspartate Aminotransferase (AST/SGOT): 14 U/L  Alanine Aminotransferase (ALT/SGPT): 12 U/L  eGFR: 125: The estimated glomerular filtration rate (eGFR) is calculated using the   2021 CKD-EPI creatinine equation, which does not have a coefficient for   race and is validated in individuals 18 years of age and older (N Engl J   Med 2021; 385:4370-6013). Creatinine-based eGFR may be inaccurate in   various situations including but not limited to extremes of muscle mass,   altered dietary protein intake, or medications that affect renal tubular   creatinine secretion. mL/min/1.73m2
Complete Blood Count (07.03.24 @ 22:00)   Nucleated RBC: 0 /100 WBCs  WBC Count: 9.95 K/uL  RBC Count: 5.94 M/uL  Hemoglobin: 16.5 g/dL  Hematocrit: 49.5 %  Mean Cell Volume: 83.3 fL  Mean Cell Hemoglobin: 27.8 pg  Mean Cell Hemoglobin Conc: 33.3 g/dL  Red Cell Distrib Width: 13.5 %  Platelet Count - Automated: 208 K/uL  MPV: 9.1 fL    Comprehensive Metabolic Panel (07.03.24 @ 22:00)   Sodium: 138 mmol/L  Potassium: 5.0 mmol/L  Chloride: 103 mmol/L  Carbon Dioxide: 29 mmol/L  Anion Gap: 6 mmol/L  Blood Urea Nitrogen: 14 mg/dL  Creatinine: 0.9 mg/dL  Glucose: 76 mg/dL  Calcium: 9.6 mg/dL  Protein Total: 6.8 g/dL  Albumin: 4.3 g/dL  Bilirubin Total: 0.5 mg/dL  Alkaline Phosphatase: 93 U/L  Aspartate Aminotransferase (AST/SGOT): 14 U/L  Alanine Aminotransferase (ALT/SGPT): 12 U/L  eGFR: 125: The estimated glomerular filtration rate (eGFR) is calculated using the   2021 CKD-EPI creatinine equation, which does not have a coefficient for   race and is validated in individuals 18 years of age and older (N Engl J   Med 2021; 385:5268-7820). Creatinine-based eGFR may be inaccurate in   various situations including but not limited to extremes of muscle mass,   altered dietary protein intake, or medications that affect renal tubular   creatinine secretion. mL/min/1.73m2
Complete Blood Count (07.03.24 @ 22:00)   Nucleated RBC: 0 /100 WBCs  WBC Count: 9.95 K/uL  RBC Count: 5.94 M/uL  Hemoglobin: 16.5 g/dL  Hematocrit: 49.5 %  Mean Cell Volume: 83.3 fL  Mean Cell Hemoglobin: 27.8 pg  Mean Cell Hemoglobin Conc: 33.3 g/dL  Red Cell Distrib Width: 13.5 %  Platelet Count - Automated: 208 K/uL  MPV: 9.1 fL    Comprehensive Metabolic Panel (07.03.24 @ 22:00)   Sodium: 138 mmol/L  Potassium: 5.0 mmol/L  Chloride: 103 mmol/L  Carbon Dioxide: 29 mmol/L  Anion Gap: 6 mmol/L  Blood Urea Nitrogen: 14 mg/dL  Creatinine: 0.9 mg/dL  Glucose: 76 mg/dL  Calcium: 9.6 mg/dL  Protein Total: 6.8 g/dL  Albumin: 4.3 g/dL  Bilirubin Total: 0.5 mg/dL  Alkaline Phosphatase: 93 U/L  Aspartate Aminotransferase (AST/SGOT): 14 U/L  Alanine Aminotransferase (ALT/SGPT): 12 U/L  eGFR: 125: The estimated glomerular filtration rate (eGFR) is calculated using the   2021 CKD-EPI creatinine equation, which does not have a coefficient for   race and is validated in individuals 18 years of age and older (N Engl J   Med 2021; 385:6678-9875). Creatinine-based eGFR may be inaccurate in   various situations including but not limited to extremes of muscle mass,   altered dietary protein intake, or medications that affect renal tubular   creatinine secretion. mL/min/1.73m2
Complete Blood Count (07.03.24 @ 22:00)   Nucleated RBC: 0 /100 WBCs  WBC Count: 9.95 K/uL  RBC Count: 5.94 M/uL  Hemoglobin: 16.5 g/dL  Hematocrit: 49.5 %  Mean Cell Volume: 83.3 fL  Mean Cell Hemoglobin: 27.8 pg  Mean Cell Hemoglobin Conc: 33.3 g/dL  Red Cell Distrib Width: 13.5 %  Platelet Count - Automated: 208 K/uL  MPV: 9.1 fL    Comprehensive Metabolic Panel (07.03.24 @ 22:00)   Sodium: 138 mmol/L  Potassium: 5.0 mmol/L  Chloride: 103 mmol/L  Carbon Dioxide: 29 mmol/L  Anion Gap: 6 mmol/L  Blood Urea Nitrogen: 14 mg/dL  Creatinine: 0.9 mg/dL  Glucose: 76 mg/dL  Calcium: 9.6 mg/dL  Protein Total: 6.8 g/dL  Albumin: 4.3 g/dL  Bilirubin Total: 0.5 mg/dL  Alkaline Phosphatase: 93 U/L  Aspartate Aminotransferase (AST/SGOT): 14 U/L  Alanine Aminotransferase (ALT/SGPT): 12 U/L  eGFR: 125: The estimated glomerular filtration rate (eGFR) is calculated using the   2021 CKD-EPI creatinine equation, which does not have a coefficient for   race and is validated in individuals 18 years of age and older (N Engl J   Med 2021; 385:6078-2853). Creatinine-based eGFR may be inaccurate in   various situations including but not limited to extremes of muscle mass,   altered dietary protein intake, or medications that affect renal tubular   creatinine secretion. mL/min/1.73m2

## 2024-07-08 NOTE — BH SAFETY PLAN - WARNING SIGN 3
RE: Plan of Care    Dear Dr. Bernard Mejia MD    Thank you for referring Shyanne SANDOVAL Phongwinifred. The following information reflects my assessment and plan of care.           Plan of Care 22   Effective from: 2022  Effective to: 2022    Plan ID: [id number]            Participants     Name Type Comments Contact Info    Bernard Mejia MD PCP - General  [phone]    Anna Farrar, OT Occupational Therapist             Shyanne Montenegro MRN:[id number] (:1962 59 year old F)             Evaluation     Author: Anna Farrar OT Status: Signed Last edited: 2022  3:30 PM       Occupational Therapy Progress Note    Referred by: Bernard Mejia MD; Medical Diagnosis (from order):    Diagnosis Information      Diagnosis    782.3 (ICD-9-CM) - R60.0 (ICD-10-CM) - Bilateral lower extremity edema              Visit: 6    Visit Type: Progress Note  Diagnosis Precautions: Hx of PE and DVT, stage 3 kidney disease, hx of cellulitis, HTN.   Latex allergy  Patient alert and oriented X3.    SUBJECTIVE                                                                                                             Agree to observer: Friend.  Patient reports some itching in her legs today, reinforced not to scratch. Reports she has been using good lotion and plans to call the dermatologist. Was able to keep on bandages 48 hours   Functional Change: None reported.   Current functional limitations: pain, difficulty, increased time, lower body dressing, sitting tasks, sitting, community distances  Pain / Symptoms:  Patient denies pain/symptoms    OBJECTIVE                                                                                                                         Measurements  Change in Measurements       • Left: Current: 332.5           - Eval/progress note measurement: 337.4 = change: 4.9          - Last measurement: 333.6 = change: 1.1       • Right: Current: 337.1           - Eval/progress note measurement:  359.7 = change: 22.6          - Last measurement: 343.1 = change: 6       • Difference between left and right: 4.6  LE Circumference (cm)        • superior patellar border (SPB): left: 47.5; right: 48       • 10 cm distal to SPB: left: 43.4; right: 43       • 20 cm distal to SPB: left: 43; right: 43.8       • 30 cm distal to SPB: left: 36.1; right: 37.1       • 35 cm distal to SPB: left: 32.9; right: 34.5       • 40 cm distal to SPB: left: 27.6; right: 27.3       • ankle (malleoli level): left: 29.5; right: 28.8       • calcaneous: left: 31.8; right: 31.4       • 5 cm proximal to first web space: left: 20.9; right: 22.7       • metatarsal phalangeal: left: 19.8; right: 20.5     - Total Circumference: left: 332.5; right: 337.1  LE Volume       • 15 cm proximal to SPB: left:  ; right:         • 10 cm proximal to SPB: left:  ; right:         • superior patellar border (SPB): left: 598.63; right: 611.3       • 10 cm distal to SPB: left: 1645.35; right:         • 20 cm distal to SPB: left:  ; right: 1499.29       • 30 cm distal to SPB: left: 1248.21; right: 1305.34       • 35 cm distal to SPB: left: 948.08; right: 1020.59       • 40 cm distal to SPB: left: 730.22; right: 763.44       • ankle (malleoli level): left: 649.03; right: 626.42     - Total Volume: left: 7305; right: 7475.89   Values stored in flowsheets.          TREATMENT                                                                                                                  Manual Therapy:   Lymphatic Drainage:     - Position: seated    - Body region: short neck, left axilla, right axilla, left groin, right groin, left axillo-inguinal anastomosis, right axillo-inguinal anastomosis, LLE, RLE and abdominal sequencing  Compression Bandage:    - Body region: BLE - and lower leg    - Layer 1: stockinette (toes closed)    - Layer 2: open cell foam    - Layer 3: short stretch bandaging (8 cm)    - Layer 4: short stretch bandaging (10 cm)    Patient  Education/Instruction:  - Compression bandage:    - Wear schedule: wear instructed time as tolerated, remove if significant discomfort occurs (Patient instructed to wear 23-48 hours and checking tolerance before bedtime, in am and again during the day and remove as appropriate.)    - Bandage care, Indications for removal, Written instruction provided and - Multi-Layer Compression Bandaging (For Your Well Being [id number])    Skilled input: verbal instruction/cues and as detailed above    Writer verbally educated and received verbal consent for hand placement, positioning of patient, and techniques to be performed today from patient for clothing adjustments for techniques, therapist position for techniques and hand placement and palpation for techniques as described above and how they are pertinent to the patient's plan of care.    Home Exercise Program: Compression bandaging  Tetragrip size G- *continue to wear tubular stockings between visits, daily, after bandages are removed  LE elevation  Ankle pumps    Skin hygiene:  Reinforced today  1.  Not to scratch itching skin but rather rub with her fingers due to risk for cellulitis    2.  Made suggestions for moisturizers such as Eucerin or Gold Bond for her dry skin areas and apply daily    3.  Instructed on how lymphedema increases risk for cellulitis     ASSESSMENT                                                                                                             Patient is progressing well towards her goals as evidenced by objective measurements, LLIS scores, and pt report of function. Pt will require further visits to further progress home program for long term management of LE edema. Requesting extension of dates past 6/25/22 as patient is progressing well and will benefit from further therapy visits for further limb reduction and home program progression.  To date the patient has made gains as expected as reported. Patient continues to have impairments and  functional deficits as noted.  Patient will continue to benefit from skilled care as outlined.  Patient Education:   Results of above outlined education: Verbalizes understanding and Needs reinforcement      PLAN                                                                                                                           Updates to plan of care: extend current plan of care    Frequency / Duration: 1 times per week tapering as patient progresses  for an estimated additional 12 visits for additional 12 weeks    Suggestions for next session as indicated: Progress per plan of care, manual lymphatic drainage, compression bandaging, progress home program       GOALS                                                                                                                           Reduce overall limb girth 15 cm- PROGRESSING  Improve tissue quality based on assessment by palpation- PROGRESSING  Reduce pain/heaviness to 1/10- PROGRESSING  The above improvements in impairments to assist in obtaining goals listed below  Long Term Goals: to be met by end of plan of care  1. Patient will report improvement on lymphedema management question on LLIS from a 2 to a 0 for self cares, including infection prevention and skin care. - MET  2. Patient will be independent with donning/doffing compression garment necessary for maintenence of lymphedema.- PROGRESSING  3. Patient will be independent with progressed and modified home exercise program. - PROGRESSING      Therapy procedure time and total treatment time can be found documented on the Time Entry flowsheet         Updated Participants     Name Type Comments Contact Info    Bernard Mejia MD PCP - General  [phone]    Signature pending    Anna Farrar OT Occupational Therapist      Electronically signed by Anna Farrar OT at 6/17/2022 6907 CDT            Please complete the attached form to indicate your approval of the plan of care upon receipt and fax  signed form to the fax number below.  Insurance compliance requires your approval be on file.  Should you have any questions, feel free to contact me.     Anna Farrar, OT  [facility] [address]  [address]  [address]  [facility] [address]  Phone: [phone]  Fax: [phone]                RE: Plan of Care for Shyanne Montenegro, YOB: 1962     I certify the need for these services, furnished under this plan of treatment and while under my care.  I agree with the plan of care as stated and request that therapy proceed.        __________________________________________________________________________________  MD Signature         Date   Time Also, another warning sign that a crisis is going to develop is I start getting racing thoughts.

## 2024-07-08 NOTE — BH INPATIENT PSYCHIATRY PROGRESS NOTE - NSBHADMITMEDEDUDETAILS_A_CORE FT
Explained above to patient - patient understood and agreed.

## 2024-07-08 NOTE — BH INPATIENT PSYCHIATRY PROGRESS NOTE - NSBHADMITMEDEDUDETAILS_PSY_A_CORE FT
Patient was manifesting irritability, anxiety, and depressive symptoms, so an SSRI was trialed.

## 2024-07-08 NOTE — BH INPATIENT PSYCHIATRY PROGRESS NOTE - NSBHFUPINTERVALHXFT_PSY_A_CORE
Patient was seen and chart reviewed. Today, the interview focused on what we can do to help patient with his goals/discharge.   After a few outbursts yesterday patient became calmer and  more agreeable.  Sometime between yesterday and today he reports he made contact with them" who wants him to come live with him in [place].  He wants us to discharge him to a shelter where his uncle will meet him.  When asked if he felt any different after taking 10 mg of Paxil yesterday he said "I think it helped with the thoughts".  On questioning about this further he reports that the thoughts are preoccupations about his girlfriend and how people do not like him as well as  thinking about suicide which , and idea that he now feels as unpleasant.  (Ego-dystonic) we agreed to continue the medication and assist his discharge.   He denies any thoughts to commit suicide and impulses to do so.   He is in no way combative or hostile or threatening.
Patient was seen and chart reviewed. Today, the interview focused on what we can do to help patient with his goals/discharge. Patient was articulate in stating he needs help with obtaining specific housing (preference: [place]), even though it was already made clear to patient that this is not possible. MD specifically pointed out that patient has been presenting different versions of events to treating team, though that is okay, and we are here to help him. Patient responded quite explosively, telling MD to get away from him. The pattern of behavior suggests that patient may have learned that yelling/shouting is the way to accomplish his goals. Ultimately, patient did apologize for behavior, but then went on to punch a wall. We discussed the addition of an antidepressant with patient given poor mood and anxiety - patient was agreeable and 10mg Paroxetine was ordered. No SI/HI upon questioning.
Patient was seen and chart reviewed. Discussed with staff. No acute events overnight. During interview, patient's primary concern is when he can leave. Patient states that he will be taking transportation (bus) to [place] and he has an uncle to stay with there. Patient also states he is already connected with mental health care in [place]. Patient states he is feeling much better and is confident he can leave and take care of himself upon discharge. Following discussion, it was decided that his discharge will be tomorrow. With respect to discharge planning, we will order his medications, set up his outpatient appointments, and provide [employer]. Patient denies SI/HI/AVH. Patient was grateful for the care he received.
Patient was seen and chart reviewed. No new complaints. Patient slept well. No safety issues.
Patient was seen and chart reviewed.  discussed with staff. No acute event overnight. he is complaint with medications.. poor insight.  preoccupied with his discharge .     denies s/h ideations. denies feeling depress.  denies a/v hallucinations     reports that his thoughts are more organized.

## 2024-07-08 NOTE — BH INPATIENT PSYCHIATRY PROGRESS NOTE - NSBHCHARTREVIEWINVESTIGATE_PSY_A_CORE FT
< from: 12 Lead ECG (07.02.24 @ 20:23) >    Ventricular Rate 63 BPM    Atrial Rate 63 BPM    P-R Interval 150 ms    QRS Duration 100 ms    Q-T Interval 366 ms    QTC Calculation(Bazett) 374 ms    P Axis 30 degrees    R Axis 96 degrees    T Axis 23 degrees    Diagnosis Line Normal sinus rhythm  Rightward axis  Incomplete right bundle branch block  Borderline ECG    Confirmed by isaura pena ([id number]) on 7/3/2024 6:47:09 AM    < end of copied text >    

## 2024-07-08 NOTE — BH INPATIENT PSYCHIATRY PROGRESS NOTE - NSBHFUPINTERVALCCFT_PSY_A_CORE
"I'm ready to leave. When can I leave?"
"I am doing well."
"  I feel much better"
"Get away from me before I flip out at you." (When MD asked how can we help)
" the medicine helped reduce the thoughts"  "I feel  much better thanks for the help"

## 2024-07-08 NOTE — BH INPATIENT PSYCHIATRY PROGRESS NOTE - NSDCCRITERIA_PSY_ALL_CORE
Patient can be discharged when it is established that he is not a danger to himself or others.

## 2024-07-08 NOTE — BH INPATIENT PSYCHIATRY PROGRESS NOTE - NSTXDCHOUSINTERMD_PSY_ALL_CORE
Continued psychiatric evaluation
Continued psychiatric evaluation and liaising with .
  Continued psychiatric evaluation

## 2024-07-08 NOTE — BH TREATMENT PLAN - NSTXPLANTHERAPYSESSIONSFT_PSY_ALL_CORE
07-03-24  Type of therapy: Coping skills, Creative arts therapy, Inspiration and motiviation, Leisure development, Stress management, Symptom management  Type of session: Individual  Level of patient participation: Resistance to participation  Duration of participation: Less than 15 minutes  Therapy conducted by: Psych rehab  Therapy Summary: Pt will need ongoing encouragement .    07-08-24  Type of therapy: Dialectical behavior therapy, Coping skills  Type of session: Group  --  --  Therapy conducted by: Social work  Therapy Summary:  encouraged Jim to attend the DBT Crisis Skills group today with peers (The IMPROVE skill was reviewed). The benefits of attending groups were discussed. Patient declined group attendance. He is encouraged to attend future sessions.    07-08-24  Type of therapy: Transition planning  Type of session: Group  Level of patient participation: Engaged, Participates  Duration of participation: 45 minutes  Therapy conducted by: Social work  Therapy Summary: Patient attended the discharge planning group with  and peers. Patients discussed their plans for discharge including follow up appointments, effective coping skills and social supports. The importance of medication management was discussed.  answered questions and provided support.     Jim was an active participant and remained engaged in the dialogue. He discussed his goal to explore housing options with family. He identified his brothers as social supports. He engaged well with peers.

## 2024-07-08 NOTE — BH INPATIENT PSYCHIATRY PROGRESS NOTE - NSTREATMENTCERT_PSY_ALL_CORE
.
87yo M with Advanced Dementia ( nonverbal, is ambulatory with assist), history of frequent falls, admitted for Aspriation pneumonia.  Palliative Care called to consult for symptom management and goals of care
.

## 2024-07-08 NOTE — BH INPATIENT PSYCHIATRY PROGRESS NOTE - NSICDXBHPRIMARYDX_PSY_ALL_CORE
Mixed personality disorder in adult   F60.89  

## 2024-07-08 NOTE — BH INPATIENT PSYCHIATRY PROGRESS NOTE - PRN MEDS
MEDICATIONS  (PRN):  acetaminophen     Tablet .. 650 milliGRAM(s) Oral every 6 hours PRN Temp greater or equal to 38C (100.4F), Mild Pain (1 - 3)  albuterol    90 MICROgram(s) HFA Inhaler 2 Puff(s) Inhalation every 6 hours PRN Shortness of Breath  haloperidol     Tablet 5 milliGRAM(s) Oral every 6 hours PRN agitation  not responding to reassurance  LORazepam     Tablet 2 milliGRAM(s) Oral every 6 hours PRN Anxiety  nicotine  Polacrilex Gum 2 milliGRAM(s) Oral every 4 hours PRN Smoking Cessation  
MEDICATIONS  (PRN):  albuterol    90 MICROgram(s) HFA Inhaler 2 Puff(s) Inhalation every 6 hours PRN Shortness of Breath  haloperidol     Tablet 5 milliGRAM(s) Oral every 6 hours PRN agitation  not responding to reassurance  LORazepam     Tablet 2 milliGRAM(s) Oral every 6 hours PRN Anxiety  nicotine  Polacrilex Gum 2 milliGRAM(s) Oral every 4 hours PRN Smoking Cessation  
MEDICATIONS  (PRN):  acetaminophen     Tablet .. 650 milliGRAM(s) Oral every 6 hours PRN Temp greater or equal to 38C (100.4F), Mild Pain (1 - 3)  albuterol    90 MICROgram(s) HFA Inhaler 2 Puff(s) Inhalation every 6 hours PRN Shortness of Breath  haloperidol     Tablet 5 milliGRAM(s) Oral every 6 hours PRN agitation  not responding to reassurance  LORazepam     Tablet 2 milliGRAM(s) Oral every 6 hours PRN Anxiety  nicotine  Polacrilex Gum 2 milliGRAM(s) Oral every 4 hours PRN Smoking Cessation  
MEDICATIONS  (PRN):  albuterol    90 MICROgram(s) HFA Inhaler 2 Puff(s) Inhalation every 6 hours PRN Shortness of Breath  haloperidol     Tablet 5 milliGRAM(s) Oral every 6 hours PRN agitation  not responding to reassurance  LORazepam     Tablet 2 milliGRAM(s) Oral every 6 hours PRN Anxiety  nicotine  Polacrilex Gum 2 milliGRAM(s) Oral every 4 hours PRN Smoking Cessation  
MEDICATIONS  (PRN):  acetaminophen     Tablet .. 650 milliGRAM(s) Oral every 6 hours PRN Temp greater or equal to 38C (100.4F), Mild Pain (1 - 3)  albuterol    90 MICROgram(s) HFA Inhaler 2 Puff(s) Inhalation every 6 hours PRN Shortness of Breath  haloperidol     Tablet 5 milliGRAM(s) Oral every 6 hours PRN agitation  not responding to reassurance  LORazepam     Tablet 2 milliGRAM(s) Oral every 6 hours PRN Anxiety  nicotine  Polacrilex Gum 2 milliGRAM(s) Oral every 4 hours PRN Smoking Cessation

## 2024-07-08 NOTE — BH INPATIENT PSYCHIATRY PROGRESS NOTE - NSTXDEPRESINTERMD_PSY_ALL_CORE
Psychiatric medications and interventive counselling as needed.

## 2024-07-08 NOTE — BH INPATIENT PSYCHIATRY PROGRESS NOTE - NSBHATTESTCOMMENTATTENDFT_PSY_A_CORE
I have reviewed case with resident and made any necessary changes or additions.  I concur with findings and plan. 
Patient was seen and examined by me. I reviewed and agreed the findings and plan as documented in the Resident's note, unless noted below. 
I have reviewed case with resident and made any necessary changes or additions.  I concur with findings and plan. 
I have reviewed case with resident and made any necessary changes or additions.  I concur with findings and plan.

## 2024-07-08 NOTE — BH INPATIENT PSYCHIATRY PROGRESS NOTE - NSICDXBHTERTIARYDX_PSY_ALL_CORE
R/O Developmental disorder   F89  

## 2024-07-08 NOTE — BH SAFETY PLAN - ENVIRONMENT SAFETY 2:
Another way I would cope withy issues would be to stay away from certain people, places and things.

## 2024-07-08 NOTE — BH INPATIENT PSYCHIATRY PROGRESS NOTE - NSBHMSEIMPULSE_PSY_A_CORE
bill using total billable min of TIMED therapeutic procedures (example: do not include dry needle or estim unattended, both untimed codes, in totals to left)  8-22 min = 1 unit; 23-37 min = 2 units; 38-52 min = 3 units; 53-67 min = 4 units; 68-82 min = 5 units   Total Total     [x]  Patient Education billed concurrently with other procedures   [x] Review HEP    [] Progressed/Changed HEP, detail:    [] Other detail:       Objective Information/Functional Measures/Assessment:  Added K-tape to the L knee  Progressed program per flow sheet which patient tolerated without increased pain  Reassess NV for PN      Patient will continue to benefit from skilled PT / OT services to modify and progress therapeutic interventions, analyze and address functional mobility deficits, analyze and address ROM deficits, analyze and address strength deficits, analyze and address soft tissue restrictions, analyze and modify for postural abnormalities, and analyze and address imbalance/dizziness to address functional deficits and attain remaining goals. Progress toward goals / Updated goals:  []  See Progress Note/Recertification  LONG-TERM GOALS TO BE ACHIEVED IN 17 TREATMENTS:    Pt will be able to perform full squat without pain to return to PLOF. Status at IE with 8/10 pain  2. Pt will be able to ambulate 1 hour without pain to be able to walk her dogs at night. Status at IE NT  3. Pt will be able to perform a floor transfer without pain to do her art work. Status at IE 8-10/10  Current: unable to kneel on the L knee 10/10/23  4.   Pt will improve FOTO score to at least 70/100 as a functional indicator of improved mobility  Status at IE 53/100    PLAN  [x]  Continue plan of care  [x]  Upgrade activities as tolerated  []  Discharge due to :  [x]  Other: reassess NV for PN    Iris Thomas PT    10/10/2023   356 PM     Future Appointments   Date Time Provider [address]   10/12/2023 11:00 AM Iris Thomas PT
Other
Normal
Normal
Other
Other
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
## 2024-07-09 VITALS — TEMPERATURE: 98 F | SYSTOLIC BLOOD PRESSURE: 120 MMHG | HEART RATE: 79 BPM | DIASTOLIC BLOOD PRESSURE: 74 MMHG

## 2024-07-09 RX ORDER — TRAZODONE HYDROCHLORIDE 50 MG/1
1 TABLET, FILM COATED ORAL
Qty: 30 | Refills: 0
Start: 2024-07-09 | End: 2024-08-07

## 2024-07-09 RX ORDER — HALOPERIDOL DECANOATE 100 MG/ML
1 VIAL (ML) INTRAMUSCULAR
Qty: 60 | Refills: 0
Start: 2024-07-09 | End: 2024-08-07

## 2024-07-09 RX ORDER — PAROXETINE HYDROCHLORIDE 37.5 MG/1
1 TABLET, FILM COATED, EXTENDED RELEASE ORAL
Qty: 30 | Refills: 0
Start: 2024-07-09 | End: 2024-08-07

## 2024-07-09 RX ADMIN — PAROXETINE HYDROCHLORIDE 10 MILLIGRAM(S): 37.5 TABLET, FILM COATED, EXTENDED RELEASE ORAL at 08:17

## 2024-07-09 RX ADMIN — Medication 5 MILLIGRAM(S): at 08:17

## 2024-07-09 RX ADMIN — FLUTICASONE PROPIONATE 1 SPRAY(S): 50 SPRAY, METERED NASAL at 08:18

## 2024-07-09 NOTE — BH INPATIENT PSYCHIATRY DISCHARGE NOTE - HOSPITAL COURSE
On admission, patient was quite hostile, telling MD team to get away from him and often not engaging with interview. He also stated he was suicidal but could guarantee his safety on the unit. Patient was started on 10mg Paxil as well as trazodone and haldol during his inpatient stay. During the course of patient's admission, his mood significantly improved. Within a few days he stated he was feeling much better and denied any SI/HI for several days prior to discharge. He also made a plan to stay with family in [place] upon discharge. Patient requested to be discharged from hospital on 7/8/24 and he was ultimately discharged on 7/9/2024 with no safety issues identified.

## 2024-07-09 NOTE — BH INPATIENT PSYCHIATRY DISCHARGE NOTE - NSBHMETABOLIC_PSY_ALL_CORE_FT
BMI: BMI (kg/m2): 30.2 (07-03-24 @ 06:58)  HbA1c: A1C with Estimated Average Glucose Result: 5.1 % (07-03-24 @ 22:00)    Glucose:   BP: 120/74 (07-09-24 @ 10:48) (103/74 - 123/78)Vital Signs Last 24 Hrs  T(C): 36.6 (07-09-24 @ 10:48), Max: 36.6 (07-09-24 @ 10:48)  T(F): 97.8 (07-09-24 @ 10:48), Max: 97.8 (07-09-24 @ 10:48)  HR: 79 (07-09-24 @ 10:48) (75 - 79)  BP: 120/74 (07-09-24 @ 10:48) (120/74 - 123/78)  BP(mean): --  RR: 16 (07-08-24 @ 16:15) (16 - 16)  SpO2: --      Lipid Panel:
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
## 2024-07-09 NOTE — BH INPATIENT PSYCHIATRY DISCHARGE NOTE - LEGAL HISTORY
Patient reports that he has never been incarcerated except overnight  despite what appears to be a significant legal history which she apparently admitted to in the ED or at least did not contradict.
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
## 2024-07-09 NOTE — BH INPATIENT PSYCHIATRY DISCHARGE NOTE - NSDCCPCAREPLAN_GEN_ALL_CORE_FT
PRINCIPAL DISCHARGE DIAGNOSIS  Diagnosis: Suicidal ideation  Assessment and Plan of Treatment:      PRINCIPAL DISCHARGE DIAGNOSIS  Diagnosis: MDD (major depressive disorder), recurrent, in partial remission  Assessment and Plan of Treatment:
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
## 2024-07-09 NOTE — BH INPATIENT PSYCHIATRY DISCHARGE NOTE - NSDCMRMEDTOKEN_GEN_ALL_CORE_FT
Colace 100 mg oral capsule: 1 cap(s) orally 2 times a day  Flonase 50 mcg/inh nasal spray: 1 spray(s) nasal 2 times a day  Glucophage 500 mg oral tablet: 1 tab(s) orally 2 times a day  haloperidol 5 mg oral tablet: 1 tab(s) orally 2 times a day x 30 days  MiraLax oral powder for reconstitution: 1 dose(s) orally 2 times a day, As Needed  Multiple Vitamins with Iron oral tablet: 1 tab(s) orally once a day  Paxil 10 mg oral tablet: 1 tab(s) orally once a day x 30 days MDD: 1 tab  Senna 8.6 mg oral tablet: 2 tab(s) orally once a day (at bedtime)  traZODone 50 mg oral tablet: 1 tab(s) orally once a day (at bedtime)
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
## 2024-07-09 NOTE — BH INPATIENT PSYCHIATRY DISCHARGE NOTE - VIOLENCE RISK FACTORS:
Substance abuse/Impulsivity/Community stressors that increase the risk of destabilization/Irritability

## 2024-07-09 NOTE — BH INPATIENT PSYCHIATRY DISCHARGE NOTE - NSBHFUPINTERVALHXFT_PSY_A_CORE
Patient was seen and chart reviewed. Discussed with staff. No acute events overnight. During interview, patient's primary concern is again when he can leave. Patient states he is feeling great and just wants to be discharged. He again feels confident that he can take care of himself on discharge and reiterates his plan to go to [place]. With respect to discharge planning, we sent his medications to his local pharmacy of request, set up his outpatient appointments, and provided [employer]. Patient denies SI/HI/AVH. Patient was grateful for the care he received.

## 2024-07-10 NOTE — BH SOCIAL WORK CONFIRMATION FOLLOW UP NOTE - NSCOMMENTS_PSY_ALL_CORE
[facility] contact call attempted by  (7/10); patient is unreachable by phone.    Jim broke his mental health outpatient appointment at [facility] on 7/11, [phone].  cannot send a referral for a wellness check as patient was discharged to [facility] in [place] at his request.     [facility] contact call attempted by  (7/10); patient is unreachable by phone.

## 2024-07-19 DIAGNOSIS — F12.99 CANNABIS USE, UNSPECIFIED WITH UNSPECIFIED CANNABIS-INDUCED DISORDER: ICD-10-CM

## 2024-07-19 DIAGNOSIS — F42.9 OBSESSIVE-COMPULSIVE DISORDER, UNSPECIFIED: ICD-10-CM

## 2024-07-19 DIAGNOSIS — F33.41 MAJOR DEPRESSIVE DISORDER, RECURRENT, IN PARTIAL REMISSION: ICD-10-CM

## 2024-07-19 DIAGNOSIS — R45.851 SUICIDAL IDEATIONS: ICD-10-CM

## 2024-07-19 DIAGNOSIS — J45.909 UNSPECIFIED ASTHMA, UNCOMPLICATED: ICD-10-CM

## 2024-07-19 DIAGNOSIS — F43.10 POST-TRAUMATIC STRESS DISORDER, UNSPECIFIED: ICD-10-CM

## 2024-07-19 DIAGNOSIS — Z59.01 SHELTERED HOMELESSNESS: ICD-10-CM

## 2024-07-19 DIAGNOSIS — F90.9 ATTENTION-DEFICIT HYPERACTIVITY DISORDER, UNSPECIFIED TYPE: ICD-10-CM

## 2024-07-19 SDOH — ECONOMIC STABILITY - HOUSING INSECURITY: SHELTERED HOMELESSNESS: Z59.01

## 2024-11-17 NOTE — ED PROVIDER NOTE - CONDITION AT DISCHARGE:
Shift: 11/17/24, [id number]    Summary: partial SBO  Orientation: A&Ox4  Vital Signs: VSS on RA  Labs: Hgb 9.4  Pain Management: Scheduled toradol and gabapentin, denies pain this shift  Bowel: 1 BM this shift, pt passing gas  Bladder: Voiding in br  IV: L PIV - SL  Wounds/Incisions: Some bruising on abdomen  Diet: Full liquids - tolerating   Activity Level: Independent   Tests/Procedures: gen surg following  Anticipated Discharge Date/Plan: Pending    Satisfactory